# Patient Record
Sex: MALE | Race: WHITE | Employment: UNEMPLOYED | URBAN - METROPOLITAN AREA
[De-identification: names, ages, dates, MRNs, and addresses within clinical notes are randomized per-mention and may not be internally consistent; named-entity substitution may affect disease eponyms.]

---

## 2018-10-18 ENCOUNTER — HOSPITAL ENCOUNTER (EMERGENCY)
Age: 38
Discharge: HOME | End: 2018-10-18
Attending: EMERGENCY MEDICINE

## 2018-10-18 ENCOUNTER — APPOINTMENT (OUTPATIENT)
Dept: CT IMAGING | Age: 38
End: 2018-10-18

## 2018-10-18 VITALS
RESPIRATION RATE: 18 BRPM | HEIGHT: 76 IN | WEIGHT: 210 LBS | BODY MASS INDEX: 25.57 KG/M2 | DIASTOLIC BLOOD PRESSURE: 70 MMHG | TEMPERATURE: 97.4 F | OXYGEN SATURATION: 98 % | HEART RATE: 93 BPM | SYSTOLIC BLOOD PRESSURE: 142 MMHG

## 2018-10-18 DIAGNOSIS — F07.81 POST CONCUSSIVE SYNDROME: ICD-10-CM

## 2018-10-18 DIAGNOSIS — S09.90XA CLOSED HEAD INJURY, INITIAL ENCOUNTER: Primary | ICD-10-CM

## 2018-10-18 PROCEDURE — 72125 CT NECK SPINE W/O DYE: CPT

## 2018-10-18 PROCEDURE — 99284 EMERGENCY DEPT VISIT MOD MDM: CPT

## 2018-10-18 PROCEDURE — 70486 CT MAXILLOFACIAL W/O DYE: CPT

## 2018-10-18 PROCEDURE — 70450 CT HEAD/BRAIN W/O DYE: CPT

## 2018-10-18 PROCEDURE — 6370000000 HC RX 637 (ALT 250 FOR IP): Performed by: EMERGENCY MEDICINE

## 2018-10-18 RX ORDER — ACETAMINOPHEN 500 MG
1000 TABLET ORAL ONCE
Status: COMPLETED | OUTPATIENT
Start: 2018-10-18 | End: 2018-10-18

## 2018-10-18 RX ADMIN — ACETAMINOPHEN 1000 MG: 500 TABLET, FILM COATED ORAL at 19:49

## 2018-10-18 ASSESSMENT — PAIN DESCRIPTION - ORIENTATION: ORIENTATION: RIGHT

## 2018-10-18 ASSESSMENT — PAIN DESCRIPTION - DESCRIPTORS: DESCRIPTORS: ACHING

## 2018-10-18 ASSESSMENT — PAIN DESCRIPTION - FREQUENCY: FREQUENCY: CONTINUOUS

## 2018-10-18 ASSESSMENT — PAIN DESCRIPTION - PROGRESSION: CLINICAL_PROGRESSION: NOT CHANGED

## 2018-10-18 ASSESSMENT — PAIN DESCRIPTION - ONSET: ONSET: ON-GOING

## 2018-10-18 ASSESSMENT — PAIN SCALES - GENERAL
PAINLEVEL_OUTOF10: 5
PAINLEVEL_OUTOF10: 2

## 2018-10-18 ASSESSMENT — PAIN DESCRIPTION - LOCATION: LOCATION: HEAD

## 2018-10-18 ASSESSMENT — PAIN DESCRIPTION - PAIN TYPE: TYPE: ACUTE PAIN

## 2018-10-18 NOTE — LETTER
[address] [place] [address] Emergency Department  [facility] [id number]  Phone: [phone]  Fax: [phone]               October 18, 2018    Patient: Alba Barros   YOB: 1980   Date of Visit: 10/18/2018       To Whom It May Concern:    Mikey Spence was seen and treated in our emergency department on 10/18/2018. He may return to work on 10/19/18.       Sincerely,       Vilma Brock RN         Signature:__________________________________

## 2018-10-19 NOTE — ED PROVIDER NOTES
zygomatic arches are intact. The mandible is intact. The mandibular condyles are normally situated. The nasal bones and maxillary nasal processes are intact. ORBITS: The globes appear intact. The extraocular muscles, optic nerve sheath complexes and lacrimal glands appear unremarkable. No retrobulbar hematoma or mass is seen. The orbital walls and rims are intact. SINUSES/MASTOIDS: There is scattered mild-to-moderate mucosal thickening in the paranasal sinuses, most pronounced in the right frontal sinus. The bilateral mastoid air cells are clear. No acute fracture is seen. SOFT TISSUES: No appreciable facial soft tissue swelling is seen. There is mild prominence of the bilateral adenoids, palatine and lingual tonsils, likely reactive. Cervical spine: BONES/ALIGNMENT: There is trace of normal cervical lordosis. There is normal alignment of the cervical spine. The vertebral body heights are maintained. No osseous destructive lesion is seen. DEGENERATIVE CHANGES: No gross spinal canal stenosis or bony neural foraminal narrowing of the cervical spine. SOFT TISSUES: No paraspinal mass is seen. No acute intracranial abnormality. No acute traumatic injury of the facial bones. No acute abnormality of the cervical spine. Straightening of normal cervical lordosis, may be positional or related to muscle spasm. MDM:  Pt presents as above. Emergent conditions considered. Presentation prompted initial imaging as above. CT imaging of patient's head, face and neck negative for acute traumatic abnormality. I do believe patient likely with postconcussive syndrome and will need close outpatient follow-up with his primary care provider team. This was discussed at length patient as well as concussive precautions with decreased mental stress. Additionally, patient was encouraged not to be at any height including roofs or ladders. Patient and mother are understanding of this and agreeable with this plan.  Counseled patient and mother on specific signs and symptoms including those of stroke on when to return to the emergency department. Questions sought and answered with the patient and mother. They voice understanding and agree with plan. Instructed to return for any worsening or worrisome concerns. Clinical Impression:  1. Closed head injury, initial encounter    2. Post concussive syndrome      Disposition referral (if applicable):  Chelsey Manrique, APRN - CNP  [facility] [place]  [phone]    Schedule an appointment as soon as possible for a visit       [address] [place] [address] Emergency Department  [place] [id number] [phone]  Today      Disposition medications (if applicable):  New Prescriptions    No medications on file       Comment: Please note this report has been produced using speech recognition software and may contain errors related to that system including errors in grammar, punctuation, and spelling, as well as words and phrases that may be inappropriate. If there are any questions or concerns please feel free to contact the dictating provider for clarification.        Ayla French MD  10/18/18 204

## 2020-07-12 ENCOUNTER — APPOINTMENT (OUTPATIENT)
Dept: GENERAL RADIOLOGY | Age: 40
End: 2020-07-12

## 2020-07-12 ENCOUNTER — HOSPITAL ENCOUNTER (EMERGENCY)
Age: 40
Discharge: HOME | End: 2020-07-12

## 2020-07-12 VITALS
SYSTOLIC BLOOD PRESSURE: 135 MMHG | WEIGHT: 260 LBS | DIASTOLIC BLOOD PRESSURE: 97 MMHG | TEMPERATURE: 98.6 F | RESPIRATION RATE: 15 BRPM | BODY MASS INDEX: 32.33 KG/M2 | OXYGEN SATURATION: 97 % | HEIGHT: 75 IN | HEART RATE: 69 BPM

## 2020-07-12 PROCEDURE — 73130 X-RAY EXAM OF HAND: CPT

## 2020-07-12 PROCEDURE — 4500000028 HC INTERMEDIATE PROCEDURE

## 2020-07-12 PROCEDURE — 6370000000 HC RX 637 (ALT 250 FOR IP): Performed by: PHYSICIAN ASSISTANT

## 2020-07-12 PROCEDURE — 99283 EMERGENCY DEPT VISIT LOW MDM: CPT

## 2020-07-12 RX ORDER — HYDROCODONE BITARTRATE AND ACETAMINOPHEN 5; 325 MG/1; MG/1
1 TABLET ORAL ONCE
Status: COMPLETED | OUTPATIENT
Start: 2020-07-12 | End: 2020-07-12

## 2020-07-12 RX ORDER — HYDROCODONE BITARTRATE AND ACETAMINOPHEN 5; 325 MG/1; MG/1
1 TABLET ORAL EVERY 6 HOURS PRN
Qty: 20 TABLET | Refills: 0 | Status: SHIPPED | OUTPATIENT
Start: 2020-07-12 | End: 2020-07-17

## 2020-07-12 RX ADMIN — HYDROCODONE BITARTRATE AND ACETAMINOPHEN 1 TABLET: 5; 325 TABLET ORAL at 22:19

## 2020-07-12 ASSESSMENT — PAIN DESCRIPTION - LOCATION: LOCATION: HAND

## 2020-07-12 ASSESSMENT — PAIN SCALES - GENERAL
PAINLEVEL_OUTOF10: 5
PAINLEVEL_OUTOF10: 5

## 2020-07-12 ASSESSMENT — PAIN DESCRIPTION - ORIENTATION: ORIENTATION: RIGHT

## 2020-07-12 ASSESSMENT — PAIN DESCRIPTION - PAIN TYPE: TYPE: ACUTE PAIN

## 2020-07-13 NOTE — ED PROVIDER NOTES
eMERGENCY dEPARTMENT eNCOUnter        [address] [facility]    Chief Complaint   Patient presents with    Hand Injury     R hand, fell and hit hand on cinder block. c/o pain to 4th and 5th digits       HPI    Lico Bartholomew is a 46 y.o. male who presents with right hand pain. Onset was prior to arrival.  Context was patient attempted to break a cinder block with his hand. Pain constant since onset. Localized to dorsal right hand along 4-5th metacarpals. Characterized as throbbing. Aggravating factors:  movement. Alleviating factors: none. Severity is a 5 on a scale of 0-10. Patient is right-hand dominant. REVIEW OF SYSTEMS    See HPI for further details. Review of systems otherwise negative. Musculoskeletal:  + hand pain, denies swelling. Integument:  Denies abrasions/lacerations. Neurologic:  Denies numbness/tingling.     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    Sciatic pain        SURGICAL HISTORY    Past Surgical History:   Procedure Laterality Date    APPENDECTOMY      age 10    CIRCUMCISION  04/27/2017    WISDOM TOOTH EXTRACTION  age 13       CURRENT MEDICATIONS        ALLERGIES    No Known Allergies    FAMILY HISTORY    Family History   Problem Relation Age of Onset    Cancer Mother         skin ca       SOCIAL HISTORY    Social History     Socioeconomic History    Marital status:      Spouse name: None    Number of children: None    Years of education: None    Highest education level: None   Occupational History    None   Social Needs    Financial resource strain: None    Food insecurity     Worry: None     Inability: None    Transportation needs     Medical: None     Non-medical: None   Tobacco Use    Smoking status: Former to ring and pinkey Reason for Exam: fall with pain to 4th, 5th digits Acuity: Acute Type of Exam: Initial Mechanism of Injury: fall Relevant Medical/Surgical History: none FINDINGS: There is an angulated, mildly impacted fracture of the 5th metacarpal neck with overlying soft tissue swelling. No dislocation is identified. Boxer's type fracture of the 5th metacarpal.     SPLINT APPLICATION PROCEDURE NOTE    An ulnar gutter splint was applied to the RUE by [facility]. I confirmed placement. Neurovascularly intact after application. Patient tolerated procedure well. No complications. ED COURSE & MEDICAL DECISION MAKING    Pertinent Labs & Imaging studies reviewed. (See chart for details)  -  Patient seen and evaluated in the emergency department. -  Triage and nursing notes reviewed and incorporated. -  Old chart records reviewed and incorporated. -  Supervising physician was Dr. Duc Esquivel.  Patient was seen independently. -  Differential diagnosis includes: fracture, dislocation, contusion, tendinitis, tenosynovitis, carpal tunnel syndrome, cellulitis, neuropathy, and others  -  Work-up included:  XR  -  Patient treated with Ottoniel Montesinos in the ED.  -  Results discussed with patient.  + boxers fracture. Splint applied as noted above. Rx [place].  FU with Orthopedics. Return as needed. He is agreeable with plan of care and disposition.  -  Patient dc home. In light of current events, I did utilize appropriate PPE (including N95 and surgical face mask, safety glasses, and gloves, as recommended by the health facility/national standard best practice, during my bedside interactions with the patient. Patient was also masked throughout ED course. FINAL IMPRESSION    1.  Closed boxer's fracture, initial encounter                Unruly Griffiths PA-C  07/12/20 1217

## 2022-01-14 ENCOUNTER — OFFICE VISIT (OUTPATIENT)
Dept: FAMILY MEDICINE CLINIC | Age: 42
End: 2022-01-14
Payer: COMMERCIAL

## 2022-01-14 VITALS
DIASTOLIC BLOOD PRESSURE: 82 MMHG | BODY MASS INDEX: 36.17 KG/M2 | WEIGHT: 297 LBS | HEART RATE: 91 BPM | OXYGEN SATURATION: 94 % | TEMPERATURE: 96.8 F | SYSTOLIC BLOOD PRESSURE: 126 MMHG | HEIGHT: 76 IN

## 2022-01-14 DIAGNOSIS — Z00.00 ENCOUNTER FOR ROUTINE HISTORY AND PHYSICAL EXAMINATION: Primary | ICD-10-CM

## 2022-01-14 PROCEDURE — G8484 FLU IMMUNIZE NO ADMIN: HCPCS | Performed by: NURSE PRACTITIONER

## 2022-01-14 PROCEDURE — 99386 PREV VISIT NEW AGE 40-64: CPT | Performed by: NURSE PRACTITIONER

## 2022-01-14 ASSESSMENT — PATIENT HEALTH QUESTIONNAIRE - PHQ9
2. FEELING DOWN, DEPRESSED OR HOPELESS: 0
SUM OF ALL RESPONSES TO PHQ QUESTIONS 1-9: 0
SUM OF ALL RESPONSES TO PHQ9 QUESTIONS 1 & 2: 0
SUM OF ALL RESPONSES TO PHQ QUESTIONS 1-9: 0
1. LITTLE INTEREST OR PLEASURE IN DOING THINGS: 0
SUM OF ALL RESPONSES TO PHQ QUESTIONS 1-9: 0
SUM OF ALL RESPONSES TO PHQ QUESTIONS 1-9: 0

## 2022-01-14 ASSESSMENT — ENCOUNTER SYMPTOMS
GASTROINTESTINAL NEGATIVE: 1
ALLERGIC/IMMUNOLOGIC NEGATIVE: 1
WHEEZING: 0
SHORTNESS OF BREATH: 0
COUGH: 0
CHEST TIGHTNESS: 0
EYES NEGATIVE: 1
CHOKING: 0

## 2022-01-14 NOTE — PROGRESS NOTES
Migue Kwok  1980  37 y.o. SUBJECT TARIQ:    Chief Complaint   Patient presents with    Annual Exam     [facility]       Malaika Mauricio is a 37year old male who is in for a history and physical. He denies recent illness or injury. He states he had hernia surgery about 3 years ago. He denies problems since. No current outpatient medications on file prior to visit. No current facility-administered medications on file prior to visit.        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    Sciatic pain      Past Surgical History:   Procedure Laterality Date    APPENDECTOMY      age 10    CIRCUMCISION  2017    WISDOM TOOTH EXTRACTION  age 13     Family History   Problem Relation Age of Onset    Cancer Mother         skin ca     Social History     Socioeconomic History    Marital status:      Spouse name: Not on file    Number of children: Not on file    Years of education: Not on file    Highest education level: Not on file   Occupational History    Not on file   Tobacco Use    Smoking status: Former Smoker     Packs/day: 0.25     Years: 15.00     Pack years: 3.75     Types: Cigarettes     Quit date: 2015     Years since quittin.9    Smokeless tobacco: Never Used   Vaping Use    Vaping Use: Never used   Substance and Sexual Activity    Alcohol use: Yes     Comment: occ    Drug use: Not Currently     Types: Marijuana Gayla Leona)     Comment: last used 2017    Sexual activity: Not on file   Other Topics Concern    Not on file   Social History Narrative    Not on file     Social Determinants of Health     Financial Resource Strain:     Difficulty of Paying Living Expenses: Not on file   Food Insecurity:     Worried About Running Out of Food in the Last Year: Not on file    Ran Out of Food in the Last Year: Not on file   Transportation Needs:     Lack of Transportation (Medical): Not on file    Lack of Transportation (Non-Medical): Not on file   Physical Activity:     Days of Exercise per Week: Not on file    Minutes of Exercise per Session: Not on file   Stress:     Feeling of Stress : Not on file   Social Connections:     Frequency of Communication with Friends and Family: Not on file    Frequency of Social Gatherings with Friends and Family: Not on file    Attends [employer] Services: Not on file    Active Member of [address] [place] [address] [employer] or Organizations: Not on file    Attends Club or Organization Meetings: Not on file    Marital Status: Not on file   Intimate Partner Violence:     Fear of Current or Ex-Partner: Not on file    Emotionally Abused: Not on file    Physically Abused: Not on file    Sexually Abused: Not on file   Housing Stability:     Unable to Pay for Housing in the Last Year: Not on file    Number of [place] in the Last Year: Not on file    Unstable Housing in the Last Year: Not on file       Review of Systems   Constitutional: Negative for activity change, appetite change, chills, diaphoresis, fatigue, fever and unexpected weight change. HENT: Negative. Eyes: Negative. Respiratory: Negative for cough, choking, chest tightness, shortness of breath and wheezing. Cardiovascular: Negative for chest pain, palpitations and leg swelling. Gastrointestinal: Negative. Endocrine: Negative. Genitourinary: Negative. Musculoskeletal: Negative. Skin: Negative. Allergic/Immunologic: Negative. Neurological: Negative. Hematological: Negative. Psychiatric/Behavioral: Negative.         OBJECTIVE:     /82 (Site: Right Upper Arm, Position: Sitting, Cuff Size: Large Adult)   Pulse 91   Temp 96.8 °F (36 °C)   Ht 6' 4\" (1.93 m)   Wt 297 lb (134.7 kg)   SpO2 94%   BMI 36.15 kg/m²     Physical Exam  Vitals reviewed. Constitutional:       General: He is not in acute distress. Appearance: Normal appearance. He is well-developed. He is not ill-appearing or diaphoretic. HENT:      Head: Normocephalic and atraumatic. Right Ear: Tympanic membrane, ear canal and external ear normal.      Left Ear: Tympanic membrane, ear canal and external ear normal.      Nose: Nose normal. No congestion or rhinorrhea. Mouth/Throat:      Mouth: Mucous membranes are moist.      Pharynx: Oropharynx is clear. No oropharyngeal exudate or posterior oropharyngeal erythema. Eyes:      General: No scleral icterus. Right eye: No discharge. Left eye: No discharge. Extraocular Movements: Extraocular movements intact. Conjunctiva/sclera: Conjunctivae normal.      Pupils: Pupils are equal, round, and reactive to light. Cardiovascular:      Rate and Rhythm: Normal rate and regular rhythm. Heart sounds: Normal heart sounds. No murmur heard. No friction rub. No gallop. Pulmonary:      Effort: Pulmonary effort is normal. No respiratory distress. Breath sounds: Normal breath sounds. No wheezing. Chest:      Chest wall: No tenderness. Abdominal:      General: Abdomen is flat. Bowel sounds are normal. There is no distension. Palpations: Abdomen is soft. There is no mass. Tenderness: There is no abdominal tenderness. There is no guarding. Hernia: No hernia is present. Musculoskeletal:         General: No swelling or tenderness. Normal range of motion. Cervical back: Normal range of motion and neck supple. No rigidity or tenderness. Right lower leg: No edema. Left lower leg: No edema. Lymphadenopathy:      Cervical: No cervical adenopathy. Skin:     General: Skin is warm and dry. Neurological:      Mental Status: He is alert and oriented to person, place, and time. Cranial Nerves: No cranial nerve deficit. Motor: No weakness.       Gait: Gait normal.      Deep Tendon Reflexes: Reflexes normal.   Psychiatric:         Behavior: Behavior normal.         Thought Content: Thought content normal.         Judgment: Judgment normal.         No results found for requested labs within last 30 days. No results found for: Hina Rude, LDLCALC    Lab Results   Component Value Date    WBC 5.8 03/14/2017    WBC 9.2 02/27/2014    HGB 15.9 03/14/2017    HGB 15.3 02/27/2014    HCT 47.1 03/14/2017    HCT 44.3 02/27/2014    MCV 88.2 03/14/2017    MCV 87.2 02/27/2014     03/14/2017     02/27/2014    SEGSABS 3.7 03/14/2017    SEGSABS 4.4 02/27/2014    LYMPHSABS 1.1 03/14/2017    MONOSABS 0.9 03/14/2017    EOSABS 0.0 03/14/2017    BASOSABS 0.0 03/14/2017     No results found for: TSH, TSHHS  Lab Results   Component Value Date    LABALBU 4.3 03/14/2017    BILITOT 0.5 03/14/2017    AST 20 03/14/2017    ALT 13 03/14/2017    ALKPHOS 37 03/14/2017             No results found for this visit on 01/14/22. ASSESSMENT AND PLAN:     1. Encounter for routine history and physical examination  - well physical examination for [facility]    Physical form completed, signed, copy retained for chart. No follow-ups on file. Care discussed with patient. Patient educated on signs and symptoms of exacerbation and when to seek further medical attention. Advised to call for any problems, questions, or concerns. Patient verbalizes understanding and agrees with plan. Medications reviewed and reconciled. Continue current medications. Appropriate prescriptions are ordered. Risks and benefits of meds are discussed. After visit summary provided.

## 2022-06-13 ENCOUNTER — HOSPITAL ENCOUNTER (EMERGENCY)
Age: 42
Discharge: LEFT BEFORE DISCHARGE | End: 2022-06-13

## 2022-06-13 VITALS
TEMPERATURE: 98 F | OXYGEN SATURATION: 97 % | HEIGHT: 76 IN | WEIGHT: 300 LBS | BODY MASS INDEX: 36.53 KG/M2 | DIASTOLIC BLOOD PRESSURE: 86 MMHG | HEART RATE: 86 BPM | RESPIRATION RATE: 18 BRPM | SYSTOLIC BLOOD PRESSURE: 153 MMHG
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
